# Patient Record
Sex: MALE | Race: WHITE
[De-identification: names, ages, dates, MRNs, and addresses within clinical notes are randomized per-mention and may not be internally consistent; named-entity substitution may affect disease eponyms.]

---

## 2018-02-14 ENCOUNTER — HOSPITAL ENCOUNTER (EMERGENCY)
Dept: HOSPITAL 17 - PHED | Age: 23
Discharge: HOME | End: 2018-02-14
Payer: SELF-PAY

## 2018-02-14 VITALS — TEMPERATURE: 102.1 F

## 2018-02-14 VITALS
HEART RATE: 93 BPM | SYSTOLIC BLOOD PRESSURE: 144 MMHG | DIASTOLIC BLOOD PRESSURE: 74 MMHG | TEMPERATURE: 100.9 F | RESPIRATION RATE: 16 BRPM | OXYGEN SATURATION: 97 %

## 2018-02-14 VITALS — BODY MASS INDEX: 33.66 KG/M2 | HEIGHT: 66 IN | WEIGHT: 209.44 LBS

## 2018-02-14 VITALS — TEMPERATURE: 99.2 F

## 2018-02-14 DIAGNOSIS — J09.X2: Primary | ICD-10-CM

## 2018-02-14 LAB
ALBUMIN SERPL-MCNC: 4.1 GM/DL (ref 3.4–5)
ALP SERPL-CCNC: 73 U/L (ref 45–117)
ALT SERPL-CCNC: 43 U/L (ref 12–78)
AST SERPL-CCNC: 20 U/L (ref 15–37)
BASOPHILS # BLD AUTO: 0 TH/MM3 (ref 0–0.2)
BASOPHILS NFR BLD: 0.2 % (ref 0–2)
BILIRUB SERPL-MCNC: 0.4 MG/DL (ref 0.2–1)
BUN SERPL-MCNC: 16 MG/DL (ref 7–18)
CALCIUM SERPL-MCNC: 8.8 MG/DL (ref 8.5–10.1)
CHLORIDE SERPL-SCNC: 101 MEQ/L (ref 98–107)
COLOR UR: YELLOW
CREAT SERPL-MCNC: 0.98 MG/DL (ref 0.6–1.3)
EOSINOPHIL # BLD: 0 TH/MM3 (ref 0–0.4)
EOSINOPHIL NFR BLD: 0 % (ref 0–4)
ERYTHROCYTE [DISTWIDTH] IN BLOOD BY AUTOMATED COUNT: 11.9 % (ref 11.6–17.2)
GFR SERPLBLD BASED ON 1.73 SQ M-ARVRAT: 94 ML/MIN (ref 89–?)
GLUCOSE SERPL-MCNC: 96 MG/DL (ref 74–106)
GLUCOSE UR STRIP-MCNC: (no result) MG/DL
HCO3 BLD-SCNC: 26.8 MEQ/L (ref 21–32)
HCT VFR BLD CALC: 44.5 % (ref 39–51)
HGB BLD-MCNC: 15.6 GM/DL (ref 13–17)
HGB UR QL STRIP: (no result)
KETONES UR STRIP-MCNC: (no result) MG/DL
LEUKOCYTE ESTERASE UR QL STRIP: (no result) /HPF (ref 0–5)
LYMPHOCYTES # BLD AUTO: 0.5 TH/MM3 (ref 1–4.8)
LYMPHOCYTES NFR BLD AUTO: 4.6 % (ref 9–44)
MCH RBC QN AUTO: 30.1 PG (ref 27–34)
MCHC RBC AUTO-ENTMCNC: 35 % (ref 32–36)
MCV RBC AUTO: 86.2 FL (ref 80–100)
MONOCYTE #: 1 TH/MM3 (ref 0–0.9)
MONOCYTES NFR BLD: 8.3 % (ref 0–8)
NEUTROPHILS # BLD AUTO: 10.2 TH/MM3 (ref 1.8–7.7)
NEUTROPHILS NFR BLD AUTO: 86.9 % (ref 16–70)
NITRITE UR QL STRIP: (no result)
PLATELET # BLD: 228 TH/MM3 (ref 150–450)
PMV BLD AUTO: 8.6 FL (ref 7–11)
PROT SERPL-MCNC: 8.2 GM/DL (ref 6.4–8.2)
RBC # BLD AUTO: 5.17 MIL/MM3 (ref 4.5–5.9)
RBC #/AREA URNS HPF: (no result) /HPF (ref 0–3)
SODIUM SERPL-SCNC: 135 MEQ/L (ref 136–145)
SP GR UR STRIP: 1.02 (ref 1–1.03)
SQUAMOUS #/AREA URNS HPF: (no result) /HPF (ref 0–5)
URINE LEUKOCYTE ESTERASE: (no result)
WBC # BLD AUTO: 11.7 TH/MM3 (ref 4–11)

## 2018-02-14 PROCEDURE — 83690 ASSAY OF LIPASE: CPT

## 2018-02-14 PROCEDURE — 80053 COMPREHEN METABOLIC PANEL: CPT

## 2018-02-14 PROCEDURE — 96374 THER/PROPH/DIAG INJ IV PUSH: CPT

## 2018-02-14 PROCEDURE — 81001 URINALYSIS AUTO W/SCOPE: CPT

## 2018-02-14 PROCEDURE — 87804 INFLUENZA ASSAY W/OPTIC: CPT

## 2018-02-14 PROCEDURE — 96361 HYDRATE IV INFUSION ADD-ON: CPT

## 2018-02-14 PROCEDURE — 99283 EMERGENCY DEPT VISIT LOW MDM: CPT

## 2018-02-14 PROCEDURE — 85025 COMPLETE CBC W/AUTO DIFF WBC: CPT

## 2018-02-14 NOTE — PD
HPI


Chief Complaint:  GI Complaint


Time Seen by Provider:  14:45


Travel History


International Travel<30 days:  No


Contact w/Intl Traveler<30days:  No


Traveled to known affect area:  No





History of Present Illness


HPI


This is an otherwise healthy 22-year-old male who presents for flulike 

symptoms.  He states that yesterday, he developed fever, chills, body aches, 

nausea and nonbilious, nonbloody emesis.  No diarrhea.  No abdominal pain.  No 

rash, neck pain or stiffness.  He has mild associated headache with the fever.  

It came on gradually and did not reach maximum intensity rapidly.  No 

photophobia.  No change in vision.  No focal weakness, numbness, tingling.  No 

urinary complaints.  No significant nasal congestion or cough.  No difficulty 

breathing, speaking, swallowing.  Symptoms are mild in severity.  Onset 

gradual.  Prior treatment includes ibuprofen about 2 hours ago.





PFSH


Past Medical History


Medical History:  Denies Significant Hx


Diminished Hearing:  No


Tetanus Vaccination:  Unknown


Influenza Vaccination:  No





Past Surgical History


Surgical History:  No Previous Surgery





Social History


Alcohol Use:  Yes (OCC)


Tobacco Use:  No


Substance Use:  No





Allergies-Medications


(Allergen,Severity, Reaction):  


Coded Allergies:  


     No Known Allergies (Unverified , 2/14/18)


Reported Meds & Prescriptions





Reported Meds & Active Scripts


Active


No Active Prescriptions or Reported Medications    








Review of Systems


Except as stated in HPI:  all other systems reviewed are Neg





Physical Exam


Narrative


GENERAL: Alert, well nourished, well appearing patient resting on the bed in no 

acute distress.  Vital Signs reviewed


SKIN: Focused skin assessment warm/dry.


HEAD: Atraumatic. Normocephalic. 


EYES: Pupils equal and round. No scleral icterus. No injection or drainage.  No 

photophobia.


ENT: No nasal bleeding or discharge.  Mucous membranes pink and moist.  TMs 

clear bilaterally.  No tenderness over the mastoids.  Posterior oropharynx with 

no erythema, edema, exudate.  Uvula is midline.


NECK: Trachea midline. No JVD. Spontaneous, painless full range of motion with 

no meningismus


CARDIOVASCULAR: Regular rate and rhythm.  No murmur appreciated.  Extremities 

warm and well perfused with bounding peripheral pulses


RESPIRATORY: No accessory muscle use. Clear to auscultation. Breath sounds 

equal bilaterally. Breathing easily and speaking in full sentences


GASTROINTESTINAL: Abdomen soft, non-tender, nondistended. Normal bowel sounds.  

No rigid, rebound, guarding.  No tenderness at McBurney's point.  Negative 

[place] sign


MUSCULOSKELETAL: No obvious deformities. No clubbing.  No cyanosis.  No edema. 

Compartments are soft


NEUROLOGICAL: Awake and alert. No obvious cranial nerve deficits.  Motor 

grossly within normal limits. Normal speech.  Sensation intact. Normal gait.  

No pronator drift





Data


Data


Last Documented VS





Vital Signs








  Date Time  Temp Pulse Resp B/P (MAP) Pulse Ox O2 Delivery O2 Flow Rate FiO2


 


2/14/18 14:51   16     


 


2/14/18 14:40 100.9 93  144/74 (97) 97   








Orders





 Orders


Complete Blood Count With Diff (2/14/18 14:54)


Comprehensive Metabolic Panel (2/14/18 14:54)


Lipase (2/14/18 14:54)


Urinalysis - C+S If Indicated (2/14/18 14:54)


Iv Access Insert/Monitor (2/14/18 14:54)


Ondansetron Inj (Zofran Inj) (2/14/18 15:00)


Sodium Chlor 0.9% 1000 Ml Inj (Ns 1000 M (2/14/18 14:54)


Sodium Chloride 0.9% Flush (Ns Flush) (2/14/18 15:00)


Influenzae A/B Antigen (2/14/18 14:54)





Labs





Laboratory Tests








Test


  2/14/18


15:03 2/14/18


15:06


 


Urine Collection Type CLEAN CATCH  


 


Urine Color YELLOW  


 


Urine Turbidity CLEAR  


 


Urine pH 6.0  


 


Urine Specific Gravity 1.022  


 


Urine Protein TRACE mg/dL  


 


Urine Glucose (UA) NEG mg/dL  


 


Urine Ketones NEG mg/dL  


 


Urine Occult Blood TRACE  


 


Urine Nitrite NEG  


 


Urine Bilirubin NEG  


 


Urine Leukocyte Esterase NEG  


 


Urine RBC 0-3 /hpf  


 


Urine WBC 0-2 /hpf  


 


Urine Squamous Epithelial


Cells 0-5 /hpf 


  


 


 


Microscopic Urinalysis Comment


  CULT NOT


INDICATED 


 


 


Urine Collection Time 15:03  


 


White Blood Count  11.7 TH/MM3 


 


Red Blood Count  5.17 MIL/MM3 


 


Hemoglobin  15.6 GM/DL 


 


Hematocrit  44.5 % 


 


Mean Corpuscular Volume  86.2 FL 


 


Mean Corpuscular Hemoglobin  30.1 PG 


 


Mean Corpuscular Hemoglobin


Concent 


  35.0 % 


 


 


Red Cell Distribution Width  11.9 % 


 


Platelet Count  228 TH/MM3 


 


Mean Platelet Volume  8.6 FL 


 


Neutrophils (%) (Auto)  86.9 % 


 


Lymphocytes (%) (Auto)  4.6 % 


 


Monocytes (%) (Auto)  8.3 % 


 


Eosinophils (%) (Auto)  0.0 % 


 


Basophils (%) (Auto)  0.2 % 


 


Neutrophils # (Auto)  10.2 TH/MM3 


 


Lymphocytes # (Auto)  0.5 TH/MM3 


 


Monocytes # (Auto)  1.0 TH/MM3 


 


Eosinophils # (Auto)  0.0 TH/MM3 


 


Basophils # (Auto)  0.0 TH/MM3 


 


CBC Comment  DIFF FINAL 


 


Differential Comment   


 


Blood Urea Nitrogen  16 MG/DL 


 


Creatinine  0.98 MG/DL 


 


Random Glucose  96 MG/DL 


 


Total Protein  8.2 GM/DL 


 


Albumin  4.1 GM/DL 


 


Calcium Level  8.8 MG/DL 


 


Alkaline Phosphatase  73 U/L 


 


Aspartate Amino Transf


(AST/SGOT) 


  20 U/L 


 


 


Alanine Aminotransferase


(ALT/SGPT) 


  43 U/L 


 


 


Total Bilirubin  0.4 MG/DL 


 


Sodium Level  135 MEQ/L 


 


Potassium Level  4.0 MEQ/L 


 


Chloride Level  101 MEQ/L 


 


Carbon Dioxide Level  26.8 MEQ/L 


 


Anion Gap  7 MEQ/L 


 


Estimat Glomerular Filtration


Rate 


  94 ML/MIN 


 


 


Lipase  69 U/L 











MDM


Medical Decision Making


Medical Screen Exam Complete:  Yes


Emergency Medical Condition:  Yes


Medical Record Reviewed:  Yes


Interpretation(s)











 Date/Time


Source Procedure


Growth Status


 


 


 2/14/18 15:09


Nasal Washing Influenza Types A,B Antigen (FOREIGN) - Final


Positive For Flu A Antigen Complete








Laboratory Tests








Test


  2/14/18


15:03 2/14/18


15:06


 


Urine Collection Type CLEAN CATCH  


 


Urine Color YELLOW  


 


Urine Turbidity CLEAR  


 


Urine pH 6.0  


 


Urine Specific Gravity 1.022  


 


Urine Protein TRACE mg/dL  


 


Urine Glucose (UA) NEG mg/dL  


 


Urine Ketones NEG mg/dL  


 


Urine Occult Blood TRACE  


 


Urine Nitrite NEG  


 


Urine Bilirubin NEG  


 


Urine Leukocyte Esterase NEG  


 


Urine RBC 0-3 /hpf  


 


Urine WBC 0-2 /hpf  


 


Urine Squamous Epithelial


Cells 0-5 /hpf 


  


 


 


Microscopic Urinalysis Comment


  CULT NOT


INDICATED 


 


 


Urine Collection Time 15:03  


 


White Blood Count  11.7 TH/MM3 


 


Red Blood Count  5.17 MIL/MM3 


 


Hemoglobin  15.6 GM/DL 


 


Hematocrit  44.5 % 


 


Mean Corpuscular Volume  86.2 FL 


 


Mean Corpuscular Hemoglobin  30.1 PG 


 


Mean Corpuscular Hemoglobin


Concent 


  35.0 % 


 


 


Red Cell Distribution Width  11.9 % 


 


Platelet Count  228 TH/MM3 


 


Mean Platelet Volume  8.6 FL 


 


Neutrophils (%) (Auto)  86.9 % 


 


Lymphocytes (%) (Auto)  4.6 % 


 


Monocytes (%) (Auto)  8.3 % 


 


Eosinophils (%) (Auto)  0.0 % 


 


Basophils (%) (Auto)  0.2 % 


 


Neutrophils # (Auto)  10.2 TH/MM3 


 


Lymphocytes # (Auto)  0.5 TH/MM3 


 


Monocytes # (Auto)  1.0 TH/MM3 


 


Eosinophils # (Auto)  0.0 TH/MM3 


 


Basophils # (Auto)  0.0 TH/MM3 


 


CBC Comment  DIFF FINAL 


 


Differential Comment   


 


Blood Urea Nitrogen  16 MG/DL 


 


Creatinine  0.98 MG/DL 


 


Random Glucose  96 MG/DL 


 


Total Protein  8.2 GM/DL 


 


Albumin  4.1 GM/DL 


 


Calcium Level  8.8 MG/DL 


 


Alkaline Phosphatase  73 U/L 


 


Aspartate Amino Transf


(AST/SGOT) 


  20 U/L 


 


 


Alanine Aminotransferase


(ALT/SGPT) 


  43 U/L 


 


 


Total Bilirubin  0.4 MG/DL 


 


Sodium Level  135 MEQ/L 


 


Potassium Level  4.0 MEQ/L 


 


Chloride Level  101 MEQ/L 


 


Carbon Dioxide Level  26.8 MEQ/L 


 


Anion Gap  7 MEQ/L 


 


Estimat Glomerular Filtration


Rate 


  94 ML/MIN 


 


 


Lipase  69 U/L 








Differential Diagnosis


Upper respiratory infection, acute gastroenteritis, dehydration, influenza, UTI

, appendicitis less likely


Narrative Course


IV access was established.  Labs, influenza tests were performed.  Patient is 

influenza A positive.  He was given IV fluids, Zofran for nausea with excellent 

result.  Upon reexamination at 3:40 PM: He is resting comfortably on the bed.  

He is drinking without difficulty.  He has had no vomiting in the emergency 

department.  His abdomen is soft, nontender, nondistended.  He was given 

Tylenol for fever and headache.  He has no neck pain or stiffness or evidence 

of meningitis.  I reviewed the results of the workup with him.  Plan for 

discharge with supportive care, Zofran for nausea, Tamiflu and close outpatient 

follow-up.  Patient understands the importance of close outpatient follow-up.  

He understands he may require further testing and treatment as an outpatient.  

He understands strict return indications.  He is comfortable with this plan and 

eager to go home.





Diagnosis





 Primary Impression:  


 Influenza A


Referrals:  


Primary Care Physician


2 days


Patient Instructions:  General Instructions, Influenza (DC)





***Additional Instructions:  


Drink plenty of fluids to stay well-hydrated.  Use Zofran for nausea.  

Alternate Tylenol and ibuprofen for fever and pain.  Take Tamiflu as directed.  

Follow up closely with primary physician.  Return with worsening symptoms


***Med/Other Pt SpecificInfo:  Prescription(s) given


Scripts


Ondansetron Odt (Zofran Odt) 4 Mg Tab


4 MG SL Q8HR Y for Nausea/Vomiting, #12 TAB 0 Refills


   Prov: Ana Rosa Nath MD         2/14/18 


Oseltamivir (Tamiflu) 75 Mg Cap


75 MG PO BID for 5 Days, #10 CAP 0 Refills


   Prov: Ana Rosa Nath MD         2/14/18


Disposition:  01 DISCHARGE HOME


Condition:  Stable











Ana Rosa Nath MD Feb 14, 2018 15:51